# Patient Record
Sex: FEMALE | Race: WHITE | NOT HISPANIC OR LATINO | URBAN - METROPOLITAN AREA
[De-identification: names, ages, dates, MRNs, and addresses within clinical notes are randomized per-mention and may not be internally consistent; named-entity substitution may affect disease eponyms.]

---

## 2017-12-06 ENCOUNTER — EMERGENCY (EMERGENCY)
Facility: HOSPITAL | Age: 22
LOS: 0 days | Discharge: HOME | End: 2017-12-06

## 2017-12-06 DIAGNOSIS — Z79.899 OTHER LONG TERM (CURRENT) DRUG THERAPY: ICD-10-CM

## 2017-12-06 DIAGNOSIS — F32.9 MAJOR DEPRESSIVE DISORDER, SINGLE EPISODE, UNSPECIFIED: ICD-10-CM

## 2017-12-06 DIAGNOSIS — J45.909 UNSPECIFIED ASTHMA, UNCOMPLICATED: ICD-10-CM

## 2018-05-25 ENCOUNTER — TRANSCRIPTION ENCOUNTER (OUTPATIENT)
Age: 23
End: 2018-05-25

## 2018-05-26 ENCOUNTER — EMERGENCY (EMERGENCY)
Facility: HOSPITAL | Age: 23
LOS: 0 days | Discharge: HOME | End: 2018-05-26
Attending: STUDENT IN AN ORGANIZED HEALTH CARE EDUCATION/TRAINING PROGRAM | Admitting: STUDENT IN AN ORGANIZED HEALTH CARE EDUCATION/TRAINING PROGRAM

## 2018-05-26 VITALS
DIASTOLIC BLOOD PRESSURE: 73 MMHG | WEIGHT: 179.9 LBS | RESPIRATION RATE: 18 BRPM | TEMPERATURE: 98 F | HEART RATE: 85 BPM | OXYGEN SATURATION: 99 % | SYSTOLIC BLOOD PRESSURE: 117 MMHG | HEIGHT: 70 IN

## 2018-05-26 VITALS
RESPIRATION RATE: 16 BRPM | OXYGEN SATURATION: 99 % | TEMPERATURE: 98 F | HEART RATE: 65 BPM | DIASTOLIC BLOOD PRESSURE: 54 MMHG | SYSTOLIC BLOOD PRESSURE: 104 MMHG

## 2018-05-26 DIAGNOSIS — F32.9 MAJOR DEPRESSIVE DISORDER, SINGLE EPISODE, UNSPECIFIED: ICD-10-CM

## 2018-05-26 DIAGNOSIS — R10.13 EPIGASTRIC PAIN: ICD-10-CM

## 2018-05-26 DIAGNOSIS — R10.9 UNSPECIFIED ABDOMINAL PAIN: ICD-10-CM

## 2018-05-26 DIAGNOSIS — R11.0 NAUSEA: ICD-10-CM

## 2018-05-26 DIAGNOSIS — R10.30 LOWER ABDOMINAL PAIN, UNSPECIFIED: ICD-10-CM

## 2018-05-26 LAB
APPEARANCE UR: CLEAR — SIGNIFICANT CHANGE UP
BACTERIA # UR AUTO: (no result)
BILIRUB UR-MCNC: NEGATIVE — SIGNIFICANT CHANGE UP
COLOR SPEC: YELLOW — SIGNIFICANT CHANGE UP
DIFF PNL FLD: (no result)
EPI CELLS # UR: (no result) /HPF
GLUCOSE UR QL: NEGATIVE — SIGNIFICANT CHANGE UP
KETONES UR-MCNC: 80 — SIGNIFICANT CHANGE UP
LEUKOCYTE ESTERASE UR-ACNC: (no result)
NITRITE UR-MCNC: NEGATIVE — SIGNIFICANT CHANGE UP
PH UR: 7 — SIGNIFICANT CHANGE UP (ref 5–8)
PROT UR-MCNC: NEGATIVE — SIGNIFICANT CHANGE UP
SP GR SPEC: 1.02 — SIGNIFICANT CHANGE UP (ref 1.01–1.03)
UROBILINOGEN FLD QL: 0.2 — SIGNIFICANT CHANGE UP (ref 0.2–0.2)
WBC UR QL: SIGNIFICANT CHANGE UP /HPF

## 2018-05-26 RX ORDER — PANTOPRAZOLE SODIUM 20 MG/1
40 TABLET, DELAYED RELEASE ORAL ONCE
Qty: 0 | Refills: 0 | Status: COMPLETED | OUTPATIENT
Start: 2018-05-26 | End: 2018-05-26

## 2018-05-26 RX ORDER — ONDANSETRON 8 MG/1
8 TABLET, FILM COATED ORAL ONCE
Qty: 0 | Refills: 0 | Status: COMPLETED | OUTPATIENT
Start: 2018-05-26 | End: 2018-05-26

## 2018-05-26 RX ORDER — ACETAMINOPHEN 500 MG
975 TABLET ORAL ONCE
Qty: 0 | Refills: 0 | Status: COMPLETED | OUTPATIENT
Start: 2018-05-26 | End: 2018-05-26

## 2018-05-26 RX ADMIN — PANTOPRAZOLE SODIUM 40 MILLIGRAM(S): 20 TABLET, DELAYED RELEASE ORAL at 12:42

## 2018-05-26 RX ADMIN — Medication 975 MILLIGRAM(S): at 09:47

## 2018-05-26 RX ADMIN — ONDANSETRON 8 MILLIGRAM(S): 8 TABLET, FILM COATED ORAL at 12:42

## 2018-05-26 RX ADMIN — ONDANSETRON 8 MILLIGRAM(S): 8 TABLET, FILM COATED ORAL at 08:21

## 2018-05-26 RX ADMIN — Medication 975 MILLIGRAM(S): at 09:46

## 2018-05-26 RX ADMIN — Medication 20 MILLIGRAM(S): at 08:21

## 2018-05-26 NOTE — ED PROVIDER NOTE - NS ED ROS FT
Constitutional: No fever or chills. Normal appetite. No unintended weight loss.   Eyes: No vision changes.  ENT: No hearing changes. No ear pain. No sore throat.  Neck: No neck pain or stiffness.  Cardiovascular: No chest pain, palpitations, or edema.  Pulmonary: No cough or SOB. No hemoptysis.  Abdominal: No vomiting, or diarrhea.   : No dysuria or frequency. No hematuria.   Neuro: No headache, syncope, or dizziness.  MS: No back pain. No calf pain/swelling.  Psych: No suicidal or homicidal ideations.

## 2018-05-26 NOTE — ED PROVIDER NOTE - ATTENDING CONTRIBUTION TO CARE
24 y/o F no sig PMH, p/w intermittent abd pain.  Pt has had similar pain many times before, sometimes, daily, usu relieves w/ BM.  today, pain did not resolve so  came to ED.  NO v/d, fever, dysuria, back pain.  exam unremarkable.  UA unremarkable.  Sx resolved w/ GI meds.  No red flag features in HPI or exam; Pt stable for dc w/ PMD f/up, and care as discussed.  Pt/ family understands plan and signs and symptoms for ED return.

## 2018-05-26 NOTE — ED PROVIDER NOTE - OBJECTIVE STATEMENT
23y F wo sig PMH presents for abdominal pain since Sunday. Pain is mid-abdominal and radiates to the epigastrium. Associated nausea, but no vomiting or diarrhea. Frequently wakes up with similar pains, but they resolve after a bowel movement. 23y F wo sig PMH presents for abdominal pain since Sunday. Pain is mid-abdominal and radiates to the epigastrium. Associated nausea, but no vomiting or diarrhea. Frequently wakes up with similar pains, but they resolve after a bowel movement. LMP was M-Th last week. Had associated cramping that came and went without resolution of the abdominal pain. Menstrual period was normal for her.   No CP or SOB. No vaginal discharge. No dysuria or hematuria.

## 2018-05-26 NOTE — ED PROVIDER NOTE - PHYSICAL EXAMINATION
Constitutional: Well developed, well nourished. NAD. Good general hygiene  Head: Atraumatic.  Eyes: EOMI without discomfort.   ENT: No nasal discharge. Mucous membranes moist.  Neck: Supple. Painless ROM.  Cardiovascular: Regular rhythm. Regular rate. Normal S1 and S2. No murmurs. 2+ pulses in all extremities.   Pulmonary: Normal respiratory rate and effort. Lungs clear to auscultation bilaterally. No wheezing, rales, or rhonchi. Bilateral, equal lung expansion.   Abdominal: Soft. Nondistended. Mild suprapubic tenderness without rebound or guarding.   Extremities. Pelvis stable. No lower extremity edema. Symmetric calves.  Skin: No rashes.   Neuro: AAOx3. No focal neurological deficits.  Psych: Normal mood. Normal affect.  Pt nervous.

## 2018-07-27 ENCOUNTER — OUTPATIENT (OUTPATIENT)
Dept: OUTPATIENT SERVICES | Facility: HOSPITAL | Age: 23
LOS: 1 days | Discharge: HOME | End: 2018-07-27

## 2018-07-27 DIAGNOSIS — F40.11 SOCIAL PHOBIA, GENERALIZED: ICD-10-CM

## 2018-08-23 ENCOUNTER — OUTPATIENT (OUTPATIENT)
Dept: OUTPATIENT SERVICES | Facility: HOSPITAL | Age: 23
LOS: 1 days | Discharge: HOME | End: 2018-08-23

## 2018-08-23 DIAGNOSIS — F40.11 SOCIAL PHOBIA, GENERALIZED: ICD-10-CM

## 2021-07-28 PROBLEM — Z00.00 ENCOUNTER FOR PREVENTIVE HEALTH EXAMINATION: Status: ACTIVE | Noted: 2021-07-28

## 2021-08-05 ENCOUNTER — APPOINTMENT (OUTPATIENT)
Dept: PSYCHIATRY | Facility: CLINIC | Age: 26
End: 2021-08-05

## 2021-09-27 ENCOUNTER — INPATIENT (INPATIENT)
Facility: HOSPITAL | Age: 26
LOS: 1 days | Discharge: HOME | End: 2021-09-29
Attending: INTERNAL MEDICINE | Admitting: INTERNAL MEDICINE
Payer: COMMERCIAL

## 2021-09-27 VITALS
SYSTOLIC BLOOD PRESSURE: 140 MMHG | WEIGHT: 169.98 LBS | RESPIRATION RATE: 18 BRPM | HEIGHT: 70 IN | OXYGEN SATURATION: 99 % | TEMPERATURE: 98 F | DIASTOLIC BLOOD PRESSURE: 89 MMHG | HEART RATE: 128 BPM

## 2021-09-27 LAB
ALBUMIN SERPL ELPH-MCNC: 4.5 G/DL — SIGNIFICANT CHANGE UP (ref 3.5–5.2)
ALP SERPL-CCNC: 61 U/L — SIGNIFICANT CHANGE UP (ref 30–115)
ALT FLD-CCNC: 17 U/L — SIGNIFICANT CHANGE UP (ref 0–41)
ANION GAP SERPL CALC-SCNC: 15 MMOL/L — HIGH (ref 7–14)
AST SERPL-CCNC: 17 U/L — SIGNIFICANT CHANGE UP (ref 0–41)
BASOPHILS # BLD AUTO: 0.06 K/UL — SIGNIFICANT CHANGE UP (ref 0–0.2)
BASOPHILS NFR BLD AUTO: 0.4 % — SIGNIFICANT CHANGE UP (ref 0–1)
BILIRUB SERPL-MCNC: 0.5 MG/DL — SIGNIFICANT CHANGE UP (ref 0.2–1.2)
BUN SERPL-MCNC: 10 MG/DL — SIGNIFICANT CHANGE UP (ref 10–20)
CALCIUM SERPL-MCNC: 9.6 MG/DL — SIGNIFICANT CHANGE UP (ref 8.5–10.1)
CHLORIDE SERPL-SCNC: 105 MMOL/L — SIGNIFICANT CHANGE UP (ref 98–110)
CO2 SERPL-SCNC: 22 MMOL/L — SIGNIFICANT CHANGE UP (ref 17–32)
CREAT SERPL-MCNC: 0.8 MG/DL — SIGNIFICANT CHANGE UP (ref 0.7–1.5)
EOSINOPHIL # BLD AUTO: 0.01 K/UL — SIGNIFICANT CHANGE UP (ref 0–0.7)
EOSINOPHIL NFR BLD AUTO: 0.1 % — SIGNIFICANT CHANGE UP (ref 0–8)
GLUCOSE SERPL-MCNC: 97 MG/DL — SIGNIFICANT CHANGE UP (ref 70–99)
HCT VFR BLD CALC: 40 % — SIGNIFICANT CHANGE UP (ref 37–47)
HGB BLD-MCNC: 13 G/DL — SIGNIFICANT CHANGE UP (ref 12–16)
IMM GRANULOCYTES NFR BLD AUTO: 0.6 % — HIGH (ref 0.1–0.3)
LACTATE SERPL-SCNC: 2.8 MMOL/L — HIGH (ref 0.7–2)
LYMPHOCYTES # BLD AUTO: 1.7 K/UL — SIGNIFICANT CHANGE UP (ref 1.2–3.4)
LYMPHOCYTES # BLD AUTO: 9.9 % — LOW (ref 20.5–51.1)
MAGNESIUM SERPL-MCNC: 2 MG/DL — SIGNIFICANT CHANGE UP (ref 1.8–2.4)
MCHC RBC-ENTMCNC: 27.3 PG — SIGNIFICANT CHANGE UP (ref 27–31)
MCHC RBC-ENTMCNC: 32.5 G/DL — SIGNIFICANT CHANGE UP (ref 32–37)
MCV RBC AUTO: 84 FL — SIGNIFICANT CHANGE UP (ref 81–99)
MONOCYTES # BLD AUTO: 0.67 K/UL — HIGH (ref 0.1–0.6)
MONOCYTES NFR BLD AUTO: 3.9 % — SIGNIFICANT CHANGE UP (ref 1.7–9.3)
NEUTROPHILS # BLD AUTO: 14.57 K/UL — HIGH (ref 1.4–6.5)
NEUTROPHILS NFR BLD AUTO: 85.1 % — HIGH (ref 42.2–75.2)
NRBC # BLD: 0 /100 WBCS — SIGNIFICANT CHANGE UP (ref 0–0)
PLATELET # BLD AUTO: 186 K/UL — SIGNIFICANT CHANGE UP (ref 130–400)
POTASSIUM SERPL-MCNC: 5.5 MMOL/L — HIGH (ref 3.5–5)
POTASSIUM SERPL-SCNC: 5.5 MMOL/L — HIGH (ref 3.5–5)
PROT SERPL-MCNC: 7.3 G/DL — SIGNIFICANT CHANGE UP (ref 6–8)
RBC # BLD: 4.76 M/UL — SIGNIFICANT CHANGE UP (ref 4.2–5.4)
RBC # FLD: 12.1 % — SIGNIFICANT CHANGE UP (ref 11.5–14.5)
SARS-COV-2 RNA SPEC QL NAA+PROBE: SIGNIFICANT CHANGE UP
SODIUM SERPL-SCNC: 142 MMOL/L — SIGNIFICANT CHANGE UP (ref 135–146)
WBC # BLD: 17.12 K/UL — HIGH (ref 4.8–10.8)
WBC # FLD AUTO: 17.12 K/UL — HIGH (ref 4.8–10.8)

## 2021-09-27 PROCEDURE — 99222 1ST HOSP IP/OBS MODERATE 55: CPT

## 2021-09-27 PROCEDURE — 70450 CT HEAD/BRAIN W/O DYE: CPT | Mod: 26,MA

## 2021-09-27 PROCEDURE — 99285 EMERGENCY DEPT VISIT HI MDM: CPT

## 2021-09-27 PROCEDURE — 99281 EMR DPT VST MAYX REQ PHY/QHP: CPT

## 2021-09-27 PROCEDURE — 93010 ELECTROCARDIOGRAM REPORT: CPT

## 2021-09-27 RX ORDER — ENOXAPARIN SODIUM 100 MG/ML
40 INJECTION SUBCUTANEOUS DAILY
Refills: 0 | Status: DISCONTINUED | OUTPATIENT
Start: 2021-09-27 | End: 2021-09-29

## 2021-09-27 RX ORDER — LIDOCAINE AND PRILOCAINE CREAM 25; 25 MG/G; MG/G
1 CREAM TOPICAL ONCE
Refills: 0 | Status: COMPLETED | OUTPATIENT
Start: 2021-09-27 | End: 2021-09-27

## 2021-09-27 RX ADMIN — LIDOCAINE AND PRILOCAINE CREAM 1 APPLICATION(S): 25; 25 CREAM TOPICAL at 14:37

## 2021-09-27 RX ADMIN — Medication 2 MILLIGRAM(S): at 12:31

## 2021-09-27 NOTE — H&P ADULT - NSHPPHYSICALEXAM_GEN_ALL_CORE
GEN Lying in no acute distress  HEENT Pupils equal and reactive to light and accommodationSupple Neck  PULM Clear to auscultation bilaterally  CV s1s2 regular rate and rhythm  GI + bowel sounds nontnender  EXT no cyanosis or edema  PSYCH awake alert and oriented x 3  INTEG No Lesions  NEURO Moves all extremities

## 2021-09-27 NOTE — H&P ADULT - ASSESSMENT
26y female presented with seizure like activity     #Seizure like activity  VEEG  appreciate neuro eval   ct head negative    #Hyperkalemia repeat BMP     #DVT PPX

## 2021-09-27 NOTE — CONSULT NOTE ADULT - ASSESSMENT
27yo female with PMH of anxiety and depression who presents with a seizure-like episode described as "shaking" during a TMS session. 27yo female with PMH of anxiety and depression who presents with a seizure-like episode described as "shaking" during a TMS session.    Recommendations:   - CT head w/o   - Transfer the patient to EMU unit for VEEG in the [facility]   - Seizure precaution   - Mag >2  - IV Ativan for seizure >2 minutes and 2 seizures in 2 minutes

## 2021-09-27 NOTE — ED PROVIDER NOTE - ATTENDING CONTRIBUTION TO CARE
I personally evaluated the patient. I reviewed the Resident’s or Physician Assistant’s note (as assigned above), and agree with the findings and plan except as documented in my note.     26 female here from a local treatment facility after having TMS therapy and a witnessed seizure like activity episode. Pt has limited memory of the event. HPI is limited and assisted by family who provide collateral information.      ROS otherwise unremarkable    PE: female in no distress. CV: pulses intact. CHEST: normal work of breathing. ABD: nondistended. SKIN: normal. EXT: FROM. NEURO: AAO 3 no focal deficits. HEENT: mucosa normal no tongue laceration. PSYCH: severely anxious, limited insight into illness, mood fragile.     Impression: seizure like activity    Plan: IV labs imaging supportive care and reevaluation

## 2021-09-27 NOTE — ED PROVIDER NOTE - CLINICAL SUMMARY MEDICAL DECISION MAKING FREE TEXT BOX
26 female here from a local TMS therapy facility for seizure like activity. Resolved prior to arrival. Had screening labs imaging medications and reevaluation, plan is for inpatient admission for continued management. Pt seen by Neuro in ED with recommendations in care.

## 2021-09-27 NOTE — CONSULT NOTE ADULT - SUBJECTIVE AND OBJECTIVE BOX
Neurology Consult    Patient is a 25yo female with PMH of anxiety and depression who presents with a chief complaint of seizure-like episode. Patient had an episode of shaking today during her TMS (Transcranial Magnetic Stimulation) session. She regular has TMS sessions performed for her anxiety and depression but has never experienced a convulsive episode during them. Her episode lasted for a few minutes, there was no urinary incontinence or tongue bitting, but she was fatigued after the episode. She has no hx of epilepsy.        PAST MEDICAL & SURGICAL HISTORY:  Depression    No significant past surgical history        FAMILY HISTORY:      Social History: (-) x 3    Allergies    No Known Allergies    Intolerances        MEDICATIONS  (STANDING):  LORazepam     Tablet 2 milliGRAM(s) Oral Once    MEDICATIONS  (PRN):      Review of systems:    Constitutional: as per HPI  Eyes: No eye pain or discharge  ENMT:  No difficulty hearing; No sinus or throat pain  Neck: No pain or stiffness  Respiratory: No cough, wheezing, chills or hemoptysis  Cardiovascular: No chest pain, palpitations, shortness of breath, dyspnea on exertion  Gastrointestinal: No abdominal pain, nausea, vomiting or hematemesis; No diarrhea or constipation.   Genitourinary: No dysuria, frequency, hematuria or incontinence  Neurological: As per HPI  Skin: No rashes or lesions   Endocrine: No heat or cold intolerance; No hair loss  Musculoskeletal: No joint pain or swelling  Psychiatric: No depression, anxiety, mood swings  Heme/Lymph: No easy bruising or bleeding gums    Vital Signs Last 24 Hrs  T(C): 36.4 (27 Sep 2021 11:32), Max: 36.4 (27 Sep 2021 11:32)  T(F): 97.6 (27 Sep 2021 11:32), Max: 97.6 (27 Sep 2021 11:32)  HR: 128 (27 Sep 2021 11:32) (128 - 128)  BP: 140/89 (27 Sep 2021 11:32) (140/89 - 140/89)  BP(mean): --  RR: 18 (27 Sep 2021 11:32) (18 - 18)  SpO2: 99% (27 Sep 2021 11:32) (99% - 99%)      Neurological Examination:  General: Tearful. Appearance is consistent with chronologic age.  No abnormal facies.  Gross skin survey within normal limits.    Cognitive/Language:  Awake, alert, and oriented to person, place, time and date.  Recent and remote memory intact.  Fund of knowledge is appropriate.  Naming, repetition and comprehension intact.   Nondysarthric.    Cranial Nerves  - Eyes: Visual acuity intact, Visual fields full.  EOMI w/o nystagmus, skew or reported double vision.  PERRL.  No ptosis/weakness of eyelid closure.    - Face:  Facial sensation normal V1 - 3, no facial asymmetry.    - Ears/Nose/Throat:  Hearing grossly intact b/l to finger rub.  Palate elevates midline.  Tongue and uvula midline.   Motor examination:  (MRC grade R/L) 5/5 UE; 5/5 LE.  No observable drift. Normal tone and bulk. No tenderness, twitching, tremors or involuntary movements.  Sensory examination:   Intact to light touch and pinprick, pain, temperature and proprioception and vibration in all extremities.  Reflexes:   2+ b/l biceps, triceps, brachioradialis, patella and achilles.  Plantar response downgoing b/l.  Jaw jerk, Rubio, clonus absent.  Cerebellum:   FTN/HKS intact.  No dysmetria or dysdiadokinesia.  Gait narrow based and normal.      Labs:                     Neuroimaging:  [facility]:     09-27-21 @ 12:25       Neurology Consult    Patient is a 27yo female with PMH of anxiety and depression who presents with a chief complaint of seizure-like episode. Patient had an episode of shaking this morning around 10:00 during her TMS (Transcranial Magnetic Stimulation) session. The episode was witnessed by the TMS tech, but patient does not recall if she was told any details about the episode afterwards. Her episode lasted for a few minutes, there was no urinary incontinence or tongue bitting, but she was fatigued after the episode. She regularly has TMS sessions performed at [facility] for severe anxiety and depression but has never experienced a convulsive episode during them. She has no hx of epilepsy. She was taking Adderall prescribed by her Psychiatrist, but stopped a few months ago. Her only current medication is an OCP.        PAST MEDICAL & SURGICAL HISTORY:  Depression    No significant past surgical history        FAMILY HISTORY:      Social History: (-) x 3    Allergies    No Known Allergies    Intolerances        MEDICATIONS  (STANDING):  LORazepam     Tablet 2 milliGRAM(s) Oral Once    MEDICATIONS  (PRN):      Review of systems:    Constitutional: as per HPI  Eyes: No eye pain or discharge  ENMT:  No difficulty hearing; No sinus or throat pain  Neck: No pain or stiffness  Respiratory: No cough, wheezing, chills or hemoptysis  Cardiovascular: No chest pain, palpitations, shortness of breath, dyspnea on exertion  Gastrointestinal: No abdominal pain, nausea, vomiting or hematemesis; No diarrhea or constipation.   Genitourinary: No dysuria, frequency, hematuria or incontinence  Neurological: As per HPI  Skin: No rashes or lesions   Endocrine: No heat or cold intolerance; No hair loss  Musculoskeletal: No joint pain or swelling  Psychiatric: No depression, anxiety, mood swings  Heme/Lymph: No easy bruising or bleeding gums    Vital Signs Last 24 Hrs  T(C): 36.4 (27 Sep 2021 11:32), Max: 36.4 (27 Sep 2021 11:32)  T(F): 97.6 (27 Sep 2021 11:32), Max: 97.6 (27 Sep 2021 11:32)  HR: 128 (27 Sep 2021 11:32) (128 - 128)  BP: 140/89 (27 Sep 2021 11:32) (140/89 - 140/89)  BP(mean): --  RR: 18 (27 Sep 2021 11:32) (18 - 18)  SpO2: 99% (27 Sep 2021 11:32) (99% - 99%)      Neurological Examination:  General: Tearful. Appearance is consistent with chronologic age.  No abnormal facies.  Gross skin survey within normal limits.    Cognitive/Language:  Awake, alert, and oriented to person, place, time and date.  Recent and remote memory intact.  Fund of knowledge is appropriate. Nondysarthric.    Cranial Nerves  - Eyes: Visual acuity intact, Visual fields full.  EOMI w/o nystagmus, skew or reported double vision.  PERRL.  No ptosis/weakness of eyelid closure.    - Face:  Facial sensation normal V1 - 3, no facial asymmetry.    - Ears/Nose/Throat:  Hearing grossly intact b/l to finger rub.  Palate elevates midline.  Tongue and uvula midline.   Motor examination:  (MRC grade R/L) 5/5 UE; 5/5 LE.  No observable drift. Normal tone and bulk. No tenderness, twitching, tremors or involuntary movements.  Sensory examination:   Intact to light touch and pinprick, pain, temperature and proprioception and vibration in all extremities.  Reflexes:   2+ b/l biceps, triceps, brachioradialis, patella and achilles.  Plantar response downgoing b/l.   Cerebellum:   FTN/HKS intact.  No dysmetria or dysdiadokinesia.  Gait deferred      Labs:                     Neuroimaging:  [facility]:     09-27-21 @ 12:25

## 2021-09-27 NOTE — ED ADULT TRIAGE NOTE - CHIEF COMPLAINT QUOTE
BIBA from doctor's office, new onset seizures after TMS session completed today,receiving TMS treatment for anxiety/depression.

## 2021-09-27 NOTE — H&P ADULT - HISTORY OF PRESENT ILLNESS
26F hx asthma, anxiety/depression presents with new onset seizure. patient notes she was receiving TMS therapy for her anxiety/depression a few hours ago when she had a convulsive seizure, does not remember the episode well  Patient never recalled similar episode before.   Patient recounts as having TMS therapy , then no recollection of event until EMS arrived. No loss of urine or tongue biting

## 2021-09-27 NOTE — ED PROVIDER NOTE - PROGRESS NOTE DETAILS
Pt seen by Dr. Griffin, plan for admission south to epilepsy unit for EEG. covid neg, signed out to [place] hospitalist Dr. Parker as well as [facility] Hospitalist Dr. Morel.

## 2021-09-27 NOTE — ED PROVIDER NOTE - PHYSICAL EXAMINATION
Vital Signs: I have reviewed the initial vital signs.  Constitutional: well-nourished, appears stated age, no acute distress.  HEENT: Airway patent, moist MM, no erythema/swelling/deformity of oral structures. EOMI, PERRLA.  CV: regular rate, regular rhythm, well-perfused extremities, 2+ b/l DP and radial pulses equal.  Lungs: BCTA, no increased WOB.  ABD: soft, NTND, no guarding or rebound, no pulsatile mass, no hernias, no flank pain.   MSK: Neck supple, nontender, nl ROM, no stepoff. Chest nontender. Back nontender in TLS spine or to b/l bony structures. Ext nontender, nl rom, no deformity.   INTEG: Skin warm, dry, no rash.  NEURO: A&Ox3, moving all extremities, pressured speech, ambulatory, no focal neurologic deficits.   PSYCH: Calm, cooperative, nervous and anxious affect and interaction.

## 2021-09-27 NOTE — CONSULT NOTE ADULT - ATTENDING COMMENTS
I have personally seen and examined this patient on 9/27.  I have fully participated in the care of this patient.  I have reviewed all pertinent clinical information, including history, physical exam, plan and note. Patient with history of anxiety and depression presents with seizure like activity in [facility] during TMS. Patient does not  have any recollection of the event. Recommend admission to [facility] EMU for VEEG monitoring.   I have reviewed all pertinent clinical information and reviewed all relevant imaging and diagnostic studies personally.  Recommendations as above.  Agree with above assessment except as noted.

## 2021-09-27 NOTE — H&P ADULT - NSHPSOCIALHISTORY_GEN_ALL_CORE
Family history negative for seizure    Social history occasional marijuana use , denies tobacco use , denies ethanol use

## 2021-09-27 NOTE — H&P ADULT - NSHPLABSRESULTS_GEN_ALL_CORE
13.0   17.12 )-----------( 186      ( 27 Sep 2021 14:43 )             40.0   09-27    142  |  105  |  10  ----------------------------<  97  5.5<H>   |  22  |  0.8    Ca    9.6      27 Sep 2021 14:43  Mg     2.0     09-27    TPro  7.3  /  Alb  4.5  /  TBili  0.5  /  DBili  x   /  AST  17  /  ALT  17  /  AlkPhos  61  09-27      < from: 12 Lead ECG (09.27.21 @ 11:27) >     Sinus tachycardia  Rightward axis  Borderline ECG    < end of copied text >    < from: CT Head No Cont (09.27.21 @ 13:58) >    No CT evidence of acute intracranial pathology.    < end of copied text >

## 2021-09-27 NOTE — ED PROVIDER NOTE - OBJECTIVE STATEMENT
26F hx asthma, anxiety/depression presents with new onset seizure. patient notes she was receiving TMS therapy for her anxiety/depression a few hours ago when she had a convulsive seizure, does not remember the episode well, denies incontinence/tongue biting, notes she has had 20-30 sessions of this and never had a seizure before, not currently on Anti-epileptic medications. Patient currently feels tired & anxious.

## 2021-09-27 NOTE — ED ADULT NURSE NOTE - NSIMPLEMENTINTERV_GEN_ALL_ED
Implemented All Fall with Harm Risk Interventions:  [place] to call system. Call bell, personal items and telephone within reach. Instruct patient to call for assistance. Room bathroom lighting operational. Non-slip footwear when patient is off stretcher. Physically safe environment: no spills, clutter or unnecessary equipment. Stretcher in lowest position, wheels locked, appropriate side rails in place. Provide visual cue, wrist band, yellow gown, etc. Monitor gait and stability. Monitor for mental status changes and reorient to person, place, and time. Review medications for side effects contributing to fall risk. Reinforce activity limits and safety measures with patient and family. Provide visual clues: red socks.

## 2021-09-28 LAB
APPEARANCE UR: CLEAR — SIGNIFICANT CHANGE UP
BACTERIA # UR AUTO: ABNORMAL
BILIRUB UR-MCNC: NEGATIVE — SIGNIFICANT CHANGE UP
COD CRY URNS QL: ABNORMAL
COLOR SPEC: YELLOW — SIGNIFICANT CHANGE UP
DIFF PNL FLD: NEGATIVE — SIGNIFICANT CHANGE UP
EPI CELLS # UR: ABNORMAL /HPF
GLUCOSE UR QL: NEGATIVE MG/DL — SIGNIFICANT CHANGE UP
KETONES UR-MCNC: NEGATIVE — SIGNIFICANT CHANGE UP
LEUKOCYTE ESTERASE UR-ACNC: ABNORMAL
NITRITE UR-MCNC: NEGATIVE — SIGNIFICANT CHANGE UP
PH UR: 7 — SIGNIFICANT CHANGE UP (ref 5–8)
PROT UR-MCNC: NEGATIVE MG/DL — SIGNIFICANT CHANGE UP
RBC CASTS # UR COMP ASSIST: SIGNIFICANT CHANGE UP /HPF
SP GR SPEC: 1.02 — SIGNIFICANT CHANGE UP (ref 1.01–1.03)
UROBILINOGEN FLD QL: 1 MG/DL
WBC UR QL: SIGNIFICANT CHANGE UP /HPF

## 2021-09-28 PROCEDURE — 99232 SBSQ HOSP IP/OBS MODERATE 35: CPT

## 2021-09-28 NOTE — PROGRESS NOTE ADULT - SUBJECTIVE AND OBJECTIVE BOX
Patient is a 26y old  Female who presents with a chief complaint of     INTERVAL HPI/OVERNIGHT EVENTS:   Doing fine  Got VEEG set up      REVIEW OF SYSTEMS: negative  Vital Signs Last 24 Hrs  T(C): 36.2 (28 Sep 2021 04:38), Max: 37.6 (27 Sep 2021 21:28)  T(F): 97.1 (28 Sep 2021 04:38), Max: 99.7 (27 Sep 2021 21:28)  HR: 61 (28 Sep 2021 04:38) (61 - 101)  BP: 148/64 (28 Sep 2021 04:38) (121/72 - 148/64)  BP(mean): --  RR: 16 (28 Sep 2021 04:38) (16 - 16)  SpO2: --    PHYSICAL EXAM:   NAD; Normocephalic;   LUNGS - no wheezing  HEART: S1 S2+   ABDOMEN: Soft, Nontender, non distended  EXTREMITIES: no cyanosis; no edema  NERVOUS SYSTEM:  Awake and alert; no focal neuro deficits appreciated    LABS:                        13.0   17.12 )-----------( 186      ( 27 Sep 2021 14:43 )             40.0     09-27    142  |  105  |  10  ----------------------------<  97  5.5<H>   |  22  |  0.8    Ca    9.6      27 Sep 2021 14:43  Mg     2.0     09-27    TPro  7.3  /  Alb  4.5  /  TBili  0.5  /  DBili  x   /  AST  17  /  ALT  17  /  AlkPhos  61  09-27        CAPILLARY BLOOD GLUCOSE          Medications:  MEDICATIONS  (STANDING):  enoxaparin Injectable 40 milliGRAM(s) SubCutaneous daily    MEDICATIONS  (PRN):  LORazepam   Injectable 2 milliGRAM(s) IV Push three times a day PRN generalized tonic-clonic seizure lasting longer than 2 minutes

## 2021-09-28 NOTE — PROGRESS NOTE ADULT - ASSESSMENT
27yo female with PMH of anxiety and depression who presents with a seizure-like episode described as "shaking" during a TMS session.    #Seizure like activity  VEEG  appreciate neuro eval   ct head negative    #Hyperkalemia repeat BMP   No labs this am  Ordered     #DVT PPX

## 2021-09-28 NOTE — CHART NOTE - NSCHARTNOTEFT_GEN_A_CORE
Epilepsy NP:    Patient was transferred from ED North to EMU last night.  No events reported since admission. VEEG started this morning.    Additional history obtained from patient and [facility] office NP Miranda.  Per office NP, she was called to the room by a technician to evaluate patient. Patient was unresponsive with bilateral upper/lower extremities shaking while sitting in a chair. No head deviation to the side reported. The episode lasted 3-4 minutes, then patient returned to baseline in a couple of minutes and was able to communicate. Prior to becoming unresponsive patient made a brief moaning sound, then leaned back.    Patient recalls sitting in a recliner during her transcranial magnetic stimulation session (patient completed around 30 by now). At some point she started having bilateral hands/forearms numbness which she did not experience during prior treatments, and wanted to call for help by felt foggy and could not speak. Then she leaned back and cannot remember the rest, but woke up surrounded by office staff who mentioned she had a seizure. No bowel/bladder incontinence reported, no Chalino's paralysis, no tongue bite. Patient was able to call mom from the ambulance on the way to the hospital.    Patient does not currently follow up with psychiatrist. She does not want to follow up with her prior psychiatrist, and together with mom is searching for a new doctor. Patient and her mom would like to be evaluated by a hospital psychiatrist during this admission.    Plan:  VEEG monitoring  Seizure precautions  Psychiatry consult (ordered)  CBC, CMP, MG, UA (ordered)  Ativan 2mg IV PRN for generalized tonic-clonic seizure lasting longer than 2 minutes, or two consecutive seizures without return to baseline in-between    Plan discussed with medical and nursing teams Epilepsy NP:    Patient was transferred from ED [facility] to EMU last night.  No events reported since admission. VEEG started this morning.    Additional history obtained from patient and [facility] office NP Miranda.  Per office NP, she was called to the room by a TMS technician to evaluate patient. Patient was sitting in a chair, unresponsive with bilateral upper/lower extremities shaking. No head deviation to the side reported. The episode lasted 3-4 minutes, then patient returned to baseline in a couple of minutes and was able to communicate. Prior to becoming unresponsive patient made a brief moaning sound, then leaned back.    Patient recalls sitting in a recliner during her transcranial magnetic stimulation session (patient completed around 30 by now). At some point she started having bilateral hands/forearms numbness which she did not experience during prior treatments, and wanted to call for help but felt foggy and could not speak. Then she leaned back and cannot remember the rest, but woke up surrounded by office staff who mentioned she had a seizure. No bowel/bladder incontinence reported, no Chalino's paralysis, no tongue bite. Patient was able to call mom from the ambulance on the way to the hospital.    Patient does not currently follow up with psychiatrist. She chose not want to follow up with her prior psychiatrist, and together with mom is searching for a new doctor. Patient and her mom would like to be evaluated by a hospital psychiatrist during this admission.    Plan:  VEEG monitoring  Seizure precautions  No AED for now  Psychiatry consult (ordered)  CBC, CMP, Mg, UA (ordered)  Ativan 2mg IV PRN for generalized tonic-clonic seizure lasting longer than 2 minutes, or two consecutive seizures without return to baseline in-between    Plan discussed with medical and nursing teams Epilepsy NP:    Patient was transferred from ED [facility] to EMU last night.  No events reported since admission. VEEG started this morning.    Additional history obtained from patient and [facility] office NP Miranda.  Per office NP, she was called to the room by a TMS technician to evaluate patient. Patient was sitting in a chair, unresponsive with bilateral upper/lower extremities shaking. No head deviation to the side reported. The episode lasted 3-4 minutes, then patient returned to baseline in a couple of minutes and was able to communicate. Prior to becoming unresponsive patient made a brief moaning sound, then leaned back.    Patient recalls sitting in a recliner during her transcranial magnetic stimulation session (patient completed around 30 by now). At some point she started having bilateral hands/forearms numbness which she did not experience during prior treatments, and wanted to call for help but felt foggy and could not speak. Then she leaned back and cannot remember the rest, but woke up surrounded by office staff who mentioned she had a seizure. No bowel/bladder incontinence reported, no Chalino's paralysis, no tongue bite. Patient was able to call mom from the ambulance on the way to the hospital.    Patient does not currently follow up with psychiatrist. She chose not to be seen by her prior psychiatrist, and together with mom is searching for a new doctor. Patient and her mom would like to be evaluated by a hospital psychiatrist during this admission.    Plan:  VEEG monitoring  Seizure precautions  No AED for now  Psychiatry consult (ordered)  CBC, CMP, Mg, UA (ordered)  Ativan 2mg IV PRN for generalized tonic-clonic seizure lasting longer than 2 minutes, or two consecutive seizures without return to baseline in-between    Plan discussed with medical and nursing teams

## 2021-09-29 ENCOUNTER — TRANSCRIPTION ENCOUNTER (OUTPATIENT)
Age: 26
End: 2021-09-29

## 2021-09-29 VITALS
SYSTOLIC BLOOD PRESSURE: 131 MMHG | DIASTOLIC BLOOD PRESSURE: 63 MMHG | HEART RATE: 76 BPM | TEMPERATURE: 97 F | RESPIRATION RATE: 16 BRPM

## 2021-09-29 PROCEDURE — 99231 SBSQ HOSP IP/OBS SF/LOW 25: CPT

## 2021-09-29 PROCEDURE — 95720 EEG PHY/QHP EA INCR W/VEEG: CPT

## 2021-09-29 NOTE — DISCHARGE NOTE NURSING/CASE MANAGEMENT/SOCIAL WORK - PATIENT PORTAL LINK FT
You can access the FollowMyHealth Patient Portal offered by [facility] by registering at the following website: [email or web address]. By joining [employer]’s FollowMyHealth portal, you will also be able to view your health information using other applications (apps) compatible with our system.

## 2021-09-29 NOTE — DISCHARGE NOTE PROVIDER - HOSPITAL COURSE
Patient is a 26 year old Female with a past medical history of asthma, anxiety, depression presented to the ER with a seizure like activity while receiving TMS therapy for her anxiety, depression. Patient was admitted for Video EEG study as recommended by neurology. Patient was transferred to [facility]. Video EEG was completed. WBC elevated on admission, patient and mother refused repeat blood work. Patient is cleared for discharge from neurology aspect, therefore, we will discharge the patient.     Vital Signs Last 24 Hrs  T(C): 36 (29 Sep 2021 04:54), Max: 36.6 (28 Sep 2021 13:08)  T(F): 96.8 (29 Sep 2021 04:54), Max: 97.8 (28 Sep 2021 13:08)  HR: 76 (29 Sep 2021 04:54) (76 - 84)  BP: 131/63 (29 Sep 2021 04:54) (113/62 - 131/63)  RR: 16 (29 Sep 2021 04:54) (16 - 16) Patient is a 26 year old Female with a past medical history of asthma, anxiety, depression presented to the ER with a seizure like activity while receiving TMS therapy for her anxiety, depression. Patient was admitted for Video EEG study as recommended by neurology. Patient was transferred to [facility]. Video EEG was completed. WBC / k elevated on admission, patient and mother refused repeat blood work. Patient is cleared for discharge from neurology aspect, therefore, we will discharge the patient. Patient's mood is good. She will FU with Psych as out patient     Vital Signs Last 24 Hrs  T(C): 36 (29 Sep 2021 04:54), Max: 36.6 (28 Sep 2021 13:08)  T(F): 96.8 (29 Sep 2021 04:54), Max: 97.8 (28 Sep 2021 13:08)  HR: 76 (29 Sep 2021 04:54) (76 - 84)  BP: 131/63 (29 Sep 2021 04:54) (113/62 - 131/63)  RR: 16 (29 Sep 2021 04:54) (16 - 16)

## 2021-09-29 NOTE — PROGRESS NOTE ADULT - SUBJECTIVE AND OBJECTIVE BOX
Epilepsy Team Discharge Note:    VEEG monitoring completed, patient is cleared for discharge from neurology standpoint.  Patient and mother refused repeat blood work.  Psychiatry consult was cancelled by hospitalist.    Follow up with primary neurologist at [facility] in 2-3 weeks.  Follow up with psychiatrist.    Discussed with hospitalist Dr. Marshall and nursing team.   Epilepsy Team Discharge Note:    VEEG monitoring completed, patient is cleared for discharge from neurology standpoint.    WBC elevated on admission, patient and mother refused repeat blood work.  Psychiatry consult was cancelled by hospitalist.    Follow up with PMD within a week.  Follow up with primary neurologist at [facility] in 2-3 weeks.  Follow up with psychiatrist.    Plan discussed with patient and mother at the bedside, all questions answered. Patient and mother verbalized understanding.    Discussed with hospitalist Dr. Marshall and nursing team.

## 2021-09-29 NOTE — DISCHARGE NOTE PROVIDER - CARE PROVIDER_API CALL
Aleta Mcclain  Internal Medicine  [address] [place] [id number]  Phone: [phone]  Fax: ()-  Follow Up Time: 1-3 days    Bear Painter  NEUROLOGY  [address] [place] [id number]  Phone: [phone]  Fax: [phone]  Follow Up Time: 1 week    Psychiatrist,   Phone: (   )    -  Fax: (   )    -  Follow Up Time: 1 week    Snow Forrest)  Psych  Physicians  [address] [place] [id number]  Phone: [phone]  Fax: [phone]  Follow Up Time: 2 weeks

## 2021-09-29 NOTE — PROGRESS NOTE ADULT - SUBJECTIVE AND OBJECTIVE BOX
Patient is alert o x3. Her mood is good  She denies any symptoms of depression or sadness. She denied anxiety   Psych consultant called me to check if patient had ongoing symptoms, case discussed since she is stable Psych consult was thought to be   not required  at this time. FU with Out pt psych Patients mom also doesnt feel she needs one this morning

## 2021-09-29 NOTE — DISCHARGE NOTE PROVIDER - NSDCFUADDAPPT_GEN_ALL_CORE_FT
Follow up with PMD within a week.  Follow up with primary neurologist at [facility] in 2-3 weeks.  Follow up with psychiatrist.

## 2021-09-29 NOTE — DISCHARGE NOTE PROVIDER - NSDCCPCAREPLAN_GEN_ALL_CORE_FT
PRINCIPAL DISCHARGE DIAGNOSIS  Diagnosis: Seizure  Assessment and Plan of Treatment: -  - You were admitted for seizure like activity while having therapy. You were transferred from [place] to [facility] for Video EEG. Neuroepilepsy team completed Video EEG and determined no epileptic activity during observation. You need to follow up with your primary doctor, neurologist and psychiatrist as outpatient.

## 2021-09-29 NOTE — DISCHARGE NOTE NURSING/CASE MANAGEMENT/SOCIAL WORK - NSDCFUADDAPPT_GEN_ALL_CORE_FT
Follow up with PMD within a week.  Follow up with primary neurologist at [facility] in 2-3 weeks.  Follow up with psychiatrist.

## 2021-09-29 NOTE — DISCHARGE NOTE PROVIDER - PROVIDER TOKENS
PROVIDER:[TOKEN:[[id number]],FOLLOWUP:[1-3 days]],PROVIDER:[TOKEN:[[id number]],FOLLOWUP:[1 week]],FREE:[LAST:[Psychiatrist],PHONE:[(   )    -],FAX:[(   )    -],FOLLOWUP:[1 week]],PROVIDER:[TOKEN:[[id number]],FOLLOWUP:[2 weeks]]

## 2021-10-04 DIAGNOSIS — G40.309 GENERALIZED IDIOPATHIC EPILEPSY AND EPILEPTIC SYNDROMES, NOT INTRACTABLE, WITHOUT STATUS EPILEPTICUS: ICD-10-CM

## 2021-10-04 DIAGNOSIS — G40.909 EPILEPSY, UNSPECIFIED, NOT INTRACTABLE, WITHOUT STATUS EPILEPTICUS: ICD-10-CM

## 2021-10-04 DIAGNOSIS — F32.9 MAJOR DEPRESSIVE DISORDER, SINGLE EPISODE, UNSPECIFIED: ICD-10-CM

## 2021-10-04 DIAGNOSIS — E87.5 HYPERKALEMIA: ICD-10-CM

## 2021-10-04 DIAGNOSIS — F41.9 ANXIETY DISORDER, UNSPECIFIED: ICD-10-CM

## 2021-10-04 DIAGNOSIS — J45.909 UNSPECIFIED ASTHMA, UNCOMPLICATED: ICD-10-CM

## 2021-10-04 DIAGNOSIS — D72.829 ELEVATED WHITE BLOOD CELL COUNT, UNSPECIFIED: ICD-10-CM

## 2022-09-03 ENCOUNTER — NON-APPOINTMENT (OUTPATIENT)
Age: 27
End: 2022-09-03

## 2024-08-30 ENCOUNTER — APPOINTMENT (OUTPATIENT)
Dept: OPHTHALMOLOGY | Facility: CLINIC | Age: 29
End: 2024-08-30

## 2024-11-12 ENCOUNTER — NON-APPOINTMENT (OUTPATIENT)
Age: 29
End: 2024-11-12

## 2024-11-12 ENCOUNTER — APPOINTMENT (OUTPATIENT)
Facility: CLINIC | Age: 29
End: 2024-11-12
Payer: MEDICAID

## 2024-11-12 PROCEDURE — 76512 OPH US DX B-SCAN: CPT | Mod: LT

## 2024-11-12 PROCEDURE — 92020 GONIOSCOPY: CPT

## 2024-11-12 PROCEDURE — 92250 FUNDUS PHOTOGRAPHY W/I&R: CPT

## 2024-11-12 PROCEDURE — 99204 OFFICE O/P NEW MOD 45 MIN: CPT

## 2025-01-08 NOTE — ED PROVIDER NOTE - NS ED MD DISPO DIVISION
[facility] - THE OTHER PHARMACY IS [facility]    Medication: acetaminophen-codeine (TYLENOL with CODEINE #3) 300-30 MG per tablet      Dose/Frequency:     Take 1 tablet by mouth every 6 (six) hours as needed for moderate pain        Quantity: 90     Pharmacy: [id number] - [place] Guajardo - [id number]      Office:   [x] PCP/Provider - Dr Rae  [] Speciality/Provider -      Does the patient have enough for 3 days?   [x] Yes   [] No - Send as HP to POD      [facility]

## 2025-02-11 ENCOUNTER — APPOINTMENT (OUTPATIENT)
Facility: CLINIC | Age: 30
End: 2025-02-11